# Patient Record
Sex: MALE | Race: WHITE | NOT HISPANIC OR LATINO | Employment: OTHER | URBAN - METROPOLITAN AREA
[De-identification: names, ages, dates, MRNs, and addresses within clinical notes are randomized per-mention and may not be internally consistent; named-entity substitution may affect disease eponyms.]

---

## 2017-01-01 ENCOUNTER — TELEPHONE (OUTPATIENT)
Dept: PHARMACY | Facility: AMBULARY SURGERY CENTER | Age: 82
End: 2017-01-01

## 2017-01-01 ENCOUNTER — DOCUMENTATION ONLY (OUTPATIENT)
Dept: INFUSION THERAPY | Facility: HOSPITAL | Age: 82
End: 2017-01-01

## 2017-01-01 ENCOUNTER — INFUSION (OUTPATIENT)
Dept: INFUSION THERAPY | Facility: HOSPITAL | Age: 82
End: 2017-01-01
Attending: INTERNAL MEDICINE
Payer: MEDICARE

## 2017-01-01 VITALS
HEART RATE: 55 BPM | RESPIRATION RATE: 16 BRPM | TEMPERATURE: 99 F | HEIGHT: 71 IN | DIASTOLIC BLOOD PRESSURE: 71 MMHG | SYSTOLIC BLOOD PRESSURE: 131 MMHG | BODY MASS INDEX: 23.19 KG/M2 | WEIGHT: 165.63 LBS

## 2017-01-01 VITALS
WEIGHT: 169.5 LBS | SYSTOLIC BLOOD PRESSURE: 145 MMHG | HEART RATE: 62 BPM | RESPIRATION RATE: 17 BRPM | DIASTOLIC BLOOD PRESSURE: 69 MMHG | OXYGEN SATURATION: 98 % | TEMPERATURE: 98 F | BODY MASS INDEX: 23.64 KG/M2

## 2017-01-01 DIAGNOSIS — C77.0 SECONDARY MALIGNANT NEOPLASM OF SUPRACLAVICULAR LYMPH NODE: Primary | ICD-10-CM

## 2017-01-01 PROCEDURE — 63600175 PHARM REV CODE 636 W HCPCS: Mod: PN | Performed by: INTERNAL MEDICINE

## 2017-01-01 PROCEDURE — 25000003 PHARM REV CODE 250: Mod: PN | Performed by: INTERNAL MEDICINE

## 2017-01-01 PROCEDURE — 96413 CHEMO IV INFUSION 1 HR: CPT | Mod: PN

## 2017-01-01 PROCEDURE — 25000003 PHARM REV CODE 250: Mod: PN | Performed by: NURSE PRACTITIONER

## 2017-01-01 PROCEDURE — 63600175 PHARM REV CODE 636 W HCPCS: Mod: JW,PN | Performed by: NURSE PRACTITIONER

## 2017-01-01 RX ORDER — SODIUM CHLORIDE 0.9 % (FLUSH) 0.9 %
10 SYRINGE (ML) INJECTION
Status: DISCONTINUED | OUTPATIENT
Start: 2017-01-01 | End: 2017-01-01 | Stop reason: HOSPADM

## 2017-01-01 RX ORDER — NYSTATIN AND TRIAMCINOLONE ACETONIDE 100000; 1 [USP'U]/G; MG/G
OINTMENT TOPICAL
COMMUNITY
Start: 2017-01-01

## 2017-01-01 RX ORDER — LANOLIN ALCOHOL/MO/W.PET/CERES
1000 CREAM (GRAM) TOPICAL
COMMUNITY

## 2017-01-01 RX ADMIN — SODIUM CHLORIDE 229 MG: 9 INJECTION, SOLUTION INTRAVENOUS at 11:02

## 2017-01-01 RX ADMIN — SODIUM CHLORIDE: 0.9 INJECTION, SOLUTION INTRAVENOUS at 10:02

## 2017-01-01 RX ADMIN — SODIUM CHLORIDE, PRESERVATIVE FREE 10 ML: 5 INJECTION INTRAVENOUS at 10:02

## 2017-01-01 RX ADMIN — SODIUM CHLORIDE: 9 INJECTION, SOLUTION INTRAVENOUS at 02:02

## 2017-01-01 RX ADMIN — SODIUM CHLORIDE 229 MG: 9 INJECTION, SOLUTION INTRAVENOUS at 02:02

## 2017-02-01 PROBLEM — C76.0 HEAD AND NECK CANCER: Status: ACTIVE | Noted: 2017-01-01

## 2017-02-09 NOTE — PROGRESS NOTES
Nutrition: Met with pt and pts wife today for chemo new patient education. 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 Pt informed me that he does not have any issues swallowing or chewing. Reports eating well. Pt consumes 1 ensure a/day. Reports weight has been stable. Wt: 168# (2/1/2017). Discussed the importance of weight maintenance. Discussed the importance of eating protein rich foods. Discussed the importance of eating small frequent meals. Encouraged to call with questions. Will follow up with pt once he begins treatment. Vicenta Guerrero,MS, RD, LDN

## 2017-02-09 NOTE — PROGRESS NOTES
: LCSW met with pt and his wife following chemotherapy education. LCSW introduced self and role. Pt lives on a fixed income and is having difficulty meeting his financial obligations due to numerous medical copay requirements. Pt given gas card/  LCSW reviewed center's newsletter detailing supportive services. Discussed LCSW availability for psychosocial support throughout treatment.  LCSW will follow up with pt regarding financial applications and will assist in other areas as needed. Ashley Richmond LCSW

## 2017-02-13 NOTE — PLAN OF CARE
Problem: Patient Care Overview  Goal: Discharge Needs Assessment  Outcome: Ongoing (interventions implemented as appropriate)  Pt tolerated first tretment well. Pt to return in 2 weeks for cycle 2-----   Schedule and appointments confirmed YG

## 2017-02-13 NOTE — PLAN OF CARE
Problem: Chemotherapy Effects (Adult)  Goal: Signs and Symptoms of Listed Potential Problems Will be Absent, Minimized or Managed (Chemotherapy Effects)  Signs and symptoms of listed potential problems will be absent, minimized or managed by discharge/transition of care (reference Chemotherapy Effects (Adult) CPG).  Outcome: Ongoing (interventions implemented as appropriate)  C1 opdivo    SSCALLONRN

## 2017-02-13 NOTE — MR AVS SNAPSHOT
Patient Information     Patient Name Sex Panchito Stone Male 1935      Visit Information        Provider Department Dept Phone Center    2017 1[facility]30 [facility] [phone] [facility]      Patient Instructions     None      Your Current Medications Are     amlodipine (NORVASC) 5 MG tablet    aspirin (ECOTRIN) 81 MG EC tablet    atenolol (TENORMIN) 100 MG tablet    b complex vitamins tablet    fluvastatin (LESCOL) 20 MG capsule    hydrocodone-acetaminophen 10-325mg (NORCO)  mg Tab    levothyroxine (SYNTHROID) 75 MCG tablet    megestrol (MEGACE) 400 mg/10 mL (40 mg/mL) Susp    metformin (GLUCOPHAGE) 500 MG tablet    omeprazole (PRILOSEC) 40 MG capsule    ondansetron (ZOFRAN-ODT) 4 MG TbDL    prochlorperazine (COMPAZINE) 10 MG tablet    tamsulosin (FLOMAX) 0.4 mg Cp24      Facility-Administered Medications     nivolumab 3 mg/kg = 229 mg in sodium chloride 0.9% 100 mL chemo infusion    sodium chloride 0.9% 100 mL flush bag    sodium chloride 0.9% flush 10 mL      Appointments for Next Year     2017 10:00 AM NON FASTING LAB (15 min.) [facility] - Laboratory LAB, [facility] DRAW STATION    Arrive at check-in approximately 15 minutes before your scheduled appointment time. Bring all outside medical records and imaging, along with a list of your current medications and insurance card.    2017 11:00 AM ESTABLISHED PATIENT (15 min.) [facility] ANGELES [facility]    Arrive at check-in approximately 15 minutes before your scheduled appointment time. Bring all outside medical records and imaging, along with a list of your current medications and insurance card.    2017  9:30 AM ESTABLISHED PATIENT (15 min.) [facility] Jean Masters MD    Arrive at check-in approximately 15 minutes before your scheduled appointment time. Bring all outside medical records and imaging, along with a list of your  current medications and insurance card.    2/27/2017  3:30 PM INFUSION 120 MIN (120 min.) [facility] CHAIR 12, [facility] CHEMO    Arrive at check-in approximately 15 minutes before your scheduled appointment time. Bring all outside medical records and imaging, along with a list of your current medications and insurance card.    1st Floor    3/13/2017  3:30 PM INFUSION 120 MIN (120 min.) [facility] CHAIR 32, [facility] CHEMO    Arrive at check-in approximately 15 minutes before your scheduled appointment time. Bring all outside medical records and imaging, along with a list of your current medications and insurance card.    1st Floor    3/14/2017 10:30 AM CT CHEST W CONTRAST (30 min.) [facility] OPP CT1 LIMIT 450 LBS    Please fast for 4 hours prior to appointment. Arrive at check-in approximately 30 minutes before your scheduled appointment time. Bring all outside medical records and imaging, along with a list of your current medications and insurance card.    6/9/2017  9:15 AM ESTABL[facility]IENT (15 min.) [facility]    Arrive at check-in approximately 15 minutes before your scheduled appointment time. Bring all outside medical records and imaging, along with a list of your current medications and insurance card.    [address]         Default Flowsheet Data (last 24 hours)      Amb Complex Vitals Alex[place]ander        02/13/17 1357                Measurements    Weight 76.9 kg (169 lb 8 oz)        BP (!)  142/74        Temp 98.7 °F (37.1 °C)        Pulse 65        Resp 16        SpO2 98 %                Allergies     No Known Allergies      Medications You Received from 02/12/2017 1526 to 02/13/2017 1526        Date/Time Order Dose Route Action     02/13/2017 1434 nivolumab 3 mg/kg = 229 mg in sodium chloride 0.9% 100 mL chemo infusion 229 mg Intravenous New Bag     02/13/2017 1434 sodium chloride 0.9% 100 mL flush bag    Intravenous New Bag      Current Discharge Medication List     Cannot display discharge medications since this is not an admission.

## 2017-02-27 NOTE — MR AVS SNAPSHOT
Patient Information     Patient Name Sex Panchito Stone Male 1935      Visit Information        Provider Department Dept Phone Center    2017 10:30 AM CHAIR 10, [facility] [phone] [facility]      Patient Instructions     None      Your Current Medications Are     amlodipine (NORVASC) 5 MG tablet    aspirin (ECOTRIN) 81 MG EC tablet    atenolol (TENORMIN) 100 MG tablet    b complex vitamins tablet    fluvastatin (LESCOL) 20 MG capsule    hydrocodone-acetaminophen 10-325mg (NORCO)  mg Tab    levothyroxine (SYNTHROID) 75 MCG tablet    megestrol (MEGACE) 400 mg/10 mL (40 mg/mL) Susp    metformin (GLUCOPHAGE) 500 MG tablet    omeprazole (PRILOSEC) 40 MG capsule    ondansetron (ZOFRAN-ODT) 4 MG TbDL    prochlorperazine (COMPAZINE) 10 MG tablet    tamsulosin (FLOMAX) 0.4 mg Cp24      Facility-Administered Medications     nivolumab 3 mg/kg = 229 mg in sodium chloride 0.9% 100 mL chemo infusion    sodium chloride 0.9% 100 mL flush bag    sodium chloride 0.9% flush 10 mL      Appointments for Next Year     3/2/2017 11:45 AM ESTABLISHED PATIENT (15 min.) [facility] On[facility] Jean Masters MD    Arrive at check-in approximately 15 minutes before your scheduled appointment time. Bring all outside medical records and imaging, along with a list of your current medications and insurance card.    3/13/2017  3:30 PM INFUSION 120 MIN (120 min.) [facility] CHAIR 32, [facility] CHE[facility]    Arrive at check-in approximately 15 minutes before your scheduled appointment time. Bring all outside medical records and imaging, along with a list of your current medications and insurance card.    1st Floor    3/14/2017 10:30 AM CT CHEST W CONTRAST (30 min.) [facility] OPP CT1 LIMIT 450 LBS    Please fast for 4 hours prior to appointment. Arrive at check-in approximately 30 minutes before your scheduled appointment time.  "Bring all outside medical records and imaging, along with a list of your current medications and insurance card.    6/9/2017  9:15 AM ESTABLISHED PATIENT (15 min.) [facility] ROSARIO Lira Jr., MD    Arrive at check-in approximately 15 minutes before your scheduled appointment time. Bring all outside medical records and imaging, along with a list of your current medications and insurance card.    [address]         Default Flowsheet Data (last 24 hours)      Amb Complex Vitals [place]ander        02/27/17 1239 02/27/17 1037             Measurements    Weight  75.1 kg (165 lb 9.6 oz)       Height  5' 11" (1.803 m)       BSA (Calculated - sq m)  1.94 sq meters       BMI (Calculated)  23.1       /71 125/72       Temp 99.1 °F (37.3 °C) 98.4 °F (36.9 °C)       Pulse (!)  55 64       Resp 16 16               Allergies     No Known Allergies      Medications You Received from 02/26/2017 1242 to 02/27/2017 1242        Date/Time Order Dose Route Action     02/27/2017 1131 nivolumab 3 mg/kg = 229 mg in sodium chloride 0.9% 100 mL chemo infusion 229 mg Intravenous New Bag     02/27/2017 1056 sodium chloride 0.9% 100 mL flush bag   Intravenous New Bag     02/27/2017 1055 sodium chloride 0.9% flush 10 mL 10 mL Intravenous Given      Current Discharge Medication List     Cannot display discharge medications since this is not an admission.      "

## 2017-02-27 NOTE — PROGRESS NOTES
Nutrition: Met with pt and pts wife today while pt was in chemo. Pts wife inquired about a gas card. Provided pt with 1 [employer] gas card, approved via phone by Ashley Richmond. Pts wife denies dietary issues. Wt:165# weight stable since cycle 1. Will assist if and when needed. Vicenta Guerrreo, MS, RD, LDN

## 2017-02-27 NOTE — PLAN OF CARE
Problem: Patient Care Overview  Goal: Plan of Care Review  Outcome: Ongoing (interventions implemented as appropriate)  Tolerated Opdivo infusion well, VSS, discharged, ambulated w/o difficulty from clinic to go home with spouse